# Patient Record
Sex: MALE | Race: BLACK OR AFRICAN AMERICAN | URBAN - METROPOLITAN AREA
[De-identification: names, ages, dates, MRNs, and addresses within clinical notes are randomized per-mention and may not be internally consistent; named-entity substitution may affect disease eponyms.]

---

## 2018-10-01 ENCOUNTER — HISTORICAL (OUTPATIENT)
Dept: SURGERY | Facility: HOSPITAL | Age: 23
End: 2018-10-01

## 2018-10-01 LAB
ABS NEUT (OLG): 2.73 X10(3)/MCL (ref 2.1–9.2)
BASOPHILS # BLD AUTO: 0.02 X10(3)/MCL
BASOPHILS NFR BLD AUTO: 0 %
BUN SERPL-MCNC: 7 MG/DL (ref 7–18)
CALCIUM SERPL-MCNC: 9 MG/DL (ref 8.5–10.1)
CHLORIDE SERPL-SCNC: 107 MMOL/L (ref 98–107)
CO2 SERPL-SCNC: 30 MMOL/L (ref 21–32)
CREAT SERPL-MCNC: 0.8 MG/DL (ref 0.6–1.3)
CREAT/UREA NIT SERPL: 9
EOSINOPHIL # BLD AUTO: 0.12 X10(3)/MCL
EOSINOPHIL NFR BLD AUTO: 2 %
ERYTHROCYTE [DISTWIDTH] IN BLOOD BY AUTOMATED COUNT: 12.5 % (ref 11.5–14.5)
EST. AVERAGE GLUCOSE BLD GHB EST-MCNC: 108 MG/DL
GLUCOSE SERPL-MCNC: 92 MG/DL (ref 74–106)
HBA1C MFR BLD: 5.4 % (ref 4.2–6.3)
HCT VFR BLD AUTO: 42.7 % (ref 40–51)
HGB BLD-MCNC: 14.1 GM/DL (ref 13.5–17.5)
IMM GRANULOCYTES # BLD AUTO: 0.01 10*3/UL
IMM GRANULOCYTES NFR BLD AUTO: 0 %
LYMPHOCYTES # BLD AUTO: 1.48 X10(3)/MCL
LYMPHOCYTES NFR BLD AUTO: 31 % (ref 13–40)
MCH RBC QN AUTO: 28.3 PG (ref 26–34)
MCHC RBC AUTO-ENTMCNC: 33 GM/DL (ref 31–37)
MCV RBC AUTO: 85.7 FL (ref 80–100)
MONOCYTES # BLD AUTO: 0.39 X10(3)/MCL
MONOCYTES NFR BLD AUTO: 8 % (ref 4–12)
NEUTROPHILS # BLD AUTO: 2.73 X10(3)/MCL
NEUTROPHILS NFR BLD AUTO: 58 X10(3)/MCL
PLATELET # BLD AUTO: 269 X10(3)/MCL (ref 130–400)
PMV BLD AUTO: 11.5 FL (ref 7.4–10.4)
POTASSIUM SERPL-SCNC: 3.6 MMOL/L (ref 3.5–5.1)
RBC # BLD AUTO: 4.98 X10(6)/MCL (ref 4.5–5.9)
SODIUM SERPL-SCNC: 142 MMOL/L (ref 136–145)
WBC # SPEC AUTO: 4.8 X10(3)/MCL (ref 4.5–11)

## 2022-04-11 ENCOUNTER — HISTORICAL (OUTPATIENT)
Dept: ADMINISTRATIVE | Facility: HOSPITAL | Age: 27
End: 2022-04-11

## 2022-04-25 VITALS — WEIGHT: 224.88 LBS | HEIGHT: 71 IN | BODY MASS INDEX: 31.48 KG/M2

## 2022-04-30 NOTE — H&P
Patient:   Jm Garcia             MRN: [id number]            FIN: [id number]               Age:   22 years     Sex:  Male     :  1995   Associated Diagnoses:   None   Author:   Deb Finn MD      H&P AC WASHOUT AND AHMED VALVE PLACEMENT os     HPI: PT here for AC washout and ahmed valve placement OS     ROS: Negative x 10        SLE:  Ext: wnl OU  L/L: wnl OU  C/S: white and quiet OU  K: clear OU  AC: deep and quiet OU  I: round and reactive OU  L: total hyphema OS      General NAP  HENT atraumatic  Eyes: hyphema OS  Neck: nontender, no masses or thyromegaly  Respiratory: no distress on room air  Cardiovascular: regular rate  Gastrointestinal: no hepatomegaly   Lymphatics: no lymphadenopathy  Musculoskeletal: wnl       Assessment/Plan  1. Total hyphema, NVG OS  - After extensive discussion of R/B/A, informed consent was signed in clinic and reviewed   - Plan for AC washout and ahmed valve placement OS

## 2022-04-30 NOTE — OP NOTE
* Final Report *  Operative Report Ophthalmology     Patient:   Jm Garcia             MRN: [id number]            FIN: [id number]               Age:   22 years     Sex:  Male     :  1995   Associated Diagnoses:   None   Author:   Willian Page MD      Patient: Jm Garcia  Primary Surgeon: FLO Page  Assistant: DEV Finn  Preop Dx: 1. neovascular glaucoma OS  2. total hyphema OS  Postop Dx: same  Procedure: 1. Ahmed glaucoma valve with patch  2. Anterior chamber washout OS  Complications: none  EBL: none  Anesthesia: LMA  Detail:    After informed consent was obtained, the patient was taken to the operative suite and placed  in supine position on the operative table.  All risks, benefits and alternatives were explained   prior to the procedure.  LMA anesthesia was induced and the left eye was prepped and draped  in sterile ophthalmic fashion.    Attention was first turned to the temporal wound where the sutures were removed and a paracentesis  was placed nasally.  The hyphema was removed using BSS on a cannula and the phacoemulsification  handpiece.  Healon was then instilled into the anterior chamber.    A 6-0 vicryl suture was then placed at the superior limbus for traction and the eye was infraducted.  A peritomy was carried out superior temporally and a pocket created for the valve.  Hemostasis  was maintained with bipolar cautery.  The valve was brought onto the field and primed with BSS.  It was placed into the pocket and secured with 10-0 nylon sutures.  The 23 gauge butterfly   needle was then used to create a tract into the anterior chamber and the tube placed through it.  The scleral patch was then brought onto the field and placed over the tube and secured with 10-0   nylon sutures.  The conjunctiva was then replaced and closed with a running 8-0 vicryl suture.    The healon was allowed to remain in the eye and the tube was in good position at the conclusion.  A patch and  shield were applied and the patient was extubated and taken to recovery in stable  condition.     I performed the entire procedure from beginning to end.    CPT: 60237, 47386     Result type: Ophthalmology Office/Clinic Note  Result date: October 01, 2018 14:46 CDT  Result status: Auth (Verified)  Result title: Operative Report Ophthalmology  Performed by: Willian Page MD on October 01, 2018 14:59 CDT  Verified by: Willian Page MD on October 01, 2018 14:59 CDT  Encounter info: [id number], [facility], Day Surgery, 10/1/2018 - 10/1/2018    Doc has been moved by HIM specialist to the correct doc type.